# Patient Record
Sex: FEMALE | Race: WHITE | URBAN - METROPOLITAN AREA
[De-identification: names, ages, dates, MRNs, and addresses within clinical notes are randomized per-mention and may not be internally consistent; named-entity substitution may affect disease eponyms.]

---

## 2023-01-25 ENCOUNTER — OFFICE VISIT (OUTPATIENT)
Dept: URBAN - METROPOLITAN AREA CLINIC 43 | Facility: CLINIC | Age: 38
End: 2023-01-25
Payer: COMMERCIAL

## 2023-01-25 DIAGNOSIS — H25.013 CORTICAL AGE-RELATED CATARACT, BILATERAL: ICD-10-CM

## 2023-01-25 DIAGNOSIS — H50.311 INTERMITTENT MONOCULAR ESOTROPIA, RIGHT EYE: Primary | ICD-10-CM

## 2023-01-25 PROCEDURE — 99204 OFFICE O/P NEW MOD 45 MIN: CPT | Performed by: OPTOMETRIST

## 2023-01-25 ASSESSMENT — INTRAOCULAR PRESSURE
OS: 10
OD: 10

## 2023-01-25 ASSESSMENT — KERATOMETRY
OS: 44.63
OD: 44.63

## 2023-01-25 ASSESSMENT — VISUAL ACUITY
OD: 20/20
OS: 20/20

## 2023-01-25 NOTE — IMPRESSION/PLAN
Impression: Intermittent monocular esotropia, right eye: H50.311.  Plan: Large angle R ET, intermittent, sees intermittent diplopia, reports possibel lazy eye as a child, will send notes to Dr. Billy Oconnor, recommend consult for strabismus surgery